# Patient Record
(demographics unavailable — no encounter records)

---

## 2024-10-21 NOTE — PHYSICAL EXAM
[Playful] : playful [Erythematous Oropharynx] : erythematous oropharynx [Tooth Eruption] : tooth eruption  [Capillary Refill <2s] : capillary refill < 2s [NL] : warm, clear

## 2024-10-23 NOTE — HISTORY OF PRESENT ILLNESS
[de-identified] : the first episode of fever in his life [FreeTextEntry6] : - since last night, developed fever 37.9C, a/w small amount of emesis/spitting up, sleeplessness,  - cold compress attempted at home - otherwise well, no other symptoms - mother thinks it maybe teething

## 2024-10-23 NOTE — HISTORY OF PRESENT ILLNESS
[de-identified] : the first episode of fever in his life [FreeTextEntry6] : - since last night, developed fever 37.9C, a/w small amount of emesis/spitting up, sleeplessness,  - cold compress attempted at home - otherwise well, no other symptoms - mother thinks it maybe teething

## 2024-10-23 NOTE — HISTORY OF PRESENT ILLNESS
[de-identified] : the first episode of fever in his life [FreeTextEntry6] : - since last night, developed fever 37.9C, a/w small amount of emesis/spitting up, sleeplessness,  - cold compress attempted at home - otherwise well, no other symptoms - mother thinks it maybe teething

## 2024-10-23 NOTE — DISCUSSION/SUMMARY
[FreeTextEntry1] : # the first episode of fever in his life - teething vs infection  - hold RVP testing for now, as he is otherwise asymptomatic - importance of monitoring emphasized; cooling, hydration, antipyretic as needed (dosage reviewed, may consider ibuprofen if more likely teething-induced) - RTC if no improvement or new symptoms/concerns arise - pedialyte samples x4 given; [id number] - will follow

## 2024-11-06 NOTE — PHYSICAL EXAM
[Alert] : alert [Normocephalic] : normocephalic [Flat Open Anterior [place]] : flat open anterior fontanelle [Red Reflex] : red reflex bilateral [PERRL] : PERRL [Normally Placed Ears] : normally placed ears [Auricles Well Formed] : auricles well formed [Clear Tympanic membranes] : clear tympanic membranes [Light reflex present] : light reflex present [Bony landmarks visible] : bony landmarks visible [Nares Patent] : nares patent [Palate Intact] : palate intact [Uvula Midline] : uvula midline [Supple, full passive range of motion] : supple, full passive range of motion [Symmetric Chest Rise] : symmetric chest rise [Clear to Auscultation Bilaterally] : clear to auscultation bilaterally [Regular Rate and Rhythm] : regular rate and rhythm [S1, S2 present] : S1, S2 present [+2 Femoral Pulses] : (+) 2 femoral pulses [Soft] : soft [Bowel Sounds] : bowel sounds present [Central Urethral Opening] : central urethral opening [Testicles Descended] : testicles descended bilaterally [No Abnormal Lymph Nodes Palpated] : no abnormal lymph nodes palpated [Symmetric Abduction and Rotation of hips] : symmetric abduction and rotation of hips [Symmetric Buttocks Creases] : symmetric buttocks creases [Straight] : straight [Cranial Nerves Grossly Intact] : cranial nerves grossly intact [Acute Distress] : no acute distress [Excessive Tearing] : no excessive tearing [Discharge] : no discharge [Palpable Masses] : no palpable masses [Murmurs] : no murmurs [Tender] : nontender [Distended] : nondistended [Hepatomegaly] : no hepatomegaly [Splenomegaly] : no splenomegaly [Allis Sign] : negative Allis sign [Metatarsus Varus] : no metatarsus varus [Leg Length Discrepancy] : no leg length discrepancy [Rash or Lesions] : no rash/lesions [de-identified] : 2 lower incisors,  [de-identified] : rt hip

## 2024-11-06 NOTE — HISTORY OF PRESENT ILLNESS
[Mother] : mother [In Crib] : sleeps in crib [Pacifier use] : Pacifier use [Brushing teeth] : brushing teeth [No] : Not at  exposure [Carbon Monoxide Detectors] : Carbon monoxide detectors [Smoke Detectors] : Smoke detectors [NO] : No [FreeTextEntry7] : has intermittent constipation, tends to get hard stools at times, mom makes most of his food, will eat, pears, spinach, broccoli, carrots, has been consitpated intermittently since started eating, also added formula recently [de-identified] : constipated stools hard, bms every2-3days,  [de-identified] : wakes up occasionally,  [de-identified] : pacifier rare, straw cups and open cups

## 2024-11-06 NOTE — DEVELOPMENTAL MILESTONES
[Uses basic gestures] : uses basic gestures [Says "Jitendra" or "Mama"] : says "Jitendra" or "Mama" nonspecifically [Sits well without support] : sits well without support [Transitions between sitting and lying] : transitions between sitting and lying [Balances on hands and knees] : balances on hands and knees [Crawls] : crawls [Picks up small objects with 3 fingers] : picks up small objects with 3 fingers and thumb [Releases objects intentionally] : releases objects intentionally [Friendship objects together] : bangs objects together [FreeTextEntry1] : crawling pulls to stand enzo,dog, cortes clapping already

## 2025-02-04 NOTE — PHYSICAL EXAM
[Alert] : alert [Normocephalic] : normocephalic [Closed Anterior [place]] : closed anterior fontanelle [Red Reflex] : red reflex bilateral [PERRL] : PERRL [Normally Placed Ears] : normally placed ears [Auricles Well Formed] : auricles well formed [Clear Tympanic membranes] : clear tympanic membranes [Light reflex present] : light reflex present [Bony landmarks visible] : bony landmarks visible [Nares Patent] : nares patent [Palate Intact] : palate intact [Uvula Midline] : uvula midline [Tooth Eruption] : tooth eruption [Supple, full passive range of motion] : supple, full passive range of motion [Symmetric Chest Rise] : symmetric chest rise [Clear to Auscultation Bilaterally] : clear to auscultation bilaterally [Regular Rate and Rhythm] : regular rate and rhythm [S1, S2 present] : S1, S2 present [+2 Femoral Pulses] : (+) 2 femoral pulses [Soft] : soft [Bowel Sounds] : normoactive bowel sounds [Central Urethral Opening] : central urethral opening [Testicles Descended] : testicles descended bilaterally [No Abnormal Lymph Nodes Palpated] : no abnormal lymph nodes palpated [Symmetric Abduction and Rotation of Hips] : symmetric abduction and rotation of hips [Straight] : straight [Cranial Nerves Grossly Intact] : cranial nerves grossly intact [Discharge] : no discharge [Palpable Masses] : no palpable masses [Murmurs] : no murmurs [Tender] : nontender [Distended] : nondistended [Hepatomegaly] : no hepatomegaly [Splenomegaly] : no splenomegaly [Allis Sign] : negative Allis sign [Rash or Lesions] : no rash/lesions [de-identified] : 2 upper and 2 lower incisors; uppr lateral incisors erupting, lower left incisor erupting

## 2025-02-04 NOTE — HISTORY OF PRESENT ILLNESS
[Mother] : mother [Breast milk] : breast milk [Normal] : Normal [Pacifier use] : Pacifier use [Toothpaste] : Primary Fluoride Source: Toothpaste [No] : Not at  exposure [Smoke Detectors] : Smoke detectors [Carbon Monoxide Detectors] : Carbon monoxide detectors [NO] : No [FreeTextEntry7] : 2 yo doing well, no concerns,  saw ortho for the hip click but all normal, had xrays of the hip and nl,  [de-identified] : eating all foods, no allergic reaction to any food

## 2025-02-04 NOTE — DEVELOPMENTAL MILESTONES
[Looks for hidden objects] : looks for hidden objects [Imitates new gestures] : imitates new gestures [Says "Dad" or "Mom" with meaning] : says "Dad" or "Mom" with meaning [Uses one word other than Mom or] : uses one word other than Mom or Dad or personal names [Follows a verbal command that] : follows a verbal command that includes a gesture [Takes first independent] : takes first independent steps [Stands without support] : stands without support [Drops object in a cup] : drops object in a cup [Picks up small object with 2 finger] : picks up small object with 2 finger pincer grasp [Picks up food and eats it] : picks up food and eats it [FreeTextEntry1] : enzo, and says s word, bellbell for his sister, dog, sissty, hey, hi,  walking, trying to run

## 2025-05-15 NOTE — PHYSICAL EXAM
[Alert] : alert [No Acute Distress] : no acute distress [Normocephalic] : normocephalic [Anterior [place] Closed] : anterior fontanelle closed [Red Reflex Bilateral] : red reflex bilateral [PERRL] : PERRL [Normally Placed Ears] : normally placed ears [Auricles Well Formed] : auricles well formed [Clear Tympanic membranes with present light reflex and bony landmarks] : clear tympanic membranes with present light reflex and bony landmarks [No Discharge] : no discharge [Nares Patent] : nares patent [Palate Intact] : palate intact [Uvula Midline] : uvula midline [Tooth Eruption] : tooth eruption  [Supple, full passive range of motion] : supple, full passive range of motion [No Palpable Masses] : no palpable masses [Symmetric Chest Rise] : symmetric chest rise [Clear to Auscultation Bilaterally] : clear to auscultation bilaterally [Regular Rate and Rhythm] : regular rate and rhythm [S1, S2 present] : S1, S2 present [No Murmurs] : no murmurs [+2 Femoral Pulses] : +2 femoral pulses [Soft] : soft [NonTender] : non tender [Non Distended] : non distended [Normoactive Bowel Sounds] : normoactive bowel sounds [No Hepatomegaly] : no hepatomegaly [No Splenomegaly] : no splenomegaly [Central Urethral Opening] : central urethral opening [Testicles Descended Bilaterally] : testicles descended bilaterally [Patent] : patent [Normally Placed] : normally placed [No Abnormal Lymph Nodes Palpated] : no abnormal lymph nodes palpated [No Clavicular Crepitus] : no clavicular crepitus [Negative Walden-Ortalani] : negative Walden-Ortalani [Symmetric Buttocks Creases] : symmetric buttocks creases [No Spinal Dimple] : no spinal dimple [NoTuft of Hair] : no tuft of hair [Cranial Nerves Grossly Intact] : cranial nerves grossly intact [No Rash or Lesions] : no rash or lesions [de-identified] : 4 upper and lower incisors

## 2025-05-15 NOTE — DEVELOPMENTAL MILESTONES
[Imitates scribbling] : imitates scribbling [Drinks from cup with little] : drinks from cup with little spilling [Points to ask for something] : points to ask for something or to get help [Uses 3 words other than names] : uses 3 words other than names [Speaks in sounds that seem like] : speaks in sounds that seem like an unknown language [Follows directions that do not] : follows direction that do not include a gesture [Looks when parent says,] : looks when parent says, "Where is...?" [Squats to  objects] : squats to  objects [Crawls up a few steps] : crawls up a few steps [Begins to run] : begins to run [Makes michael with crayon] : makes michael with nikkiyon [Drops object into and takes object] : drops object into and takes object out of container [FreeTextEntry1] : mama, nilay, enzo, no, thankyou, shoes, byby, hi, dog, hello, 10-20 words, up, out, sister,

## 2025-05-15 NOTE — PHYSICAL EXAM
[Alert] : alert [No Acute Distress] : no acute distress [Normocephalic] : normocephalic [Anterior [place] Closed] : anterior fontanelle closed [Red Reflex Bilateral] : red reflex bilateral [PERRL] : PERRL [Normally Placed Ears] : normally placed ears [Auricles Well Formed] : auricles well formed [Clear Tympanic membranes with present light reflex and bony landmarks] : clear tympanic membranes with present light reflex and bony landmarks [No Discharge] : no discharge [Nares Patent] : nares patent [Palate Intact] : palate intact [Uvula Midline] : uvula midline [Tooth Eruption] : tooth eruption  [Supple, full passive range of motion] : supple, full passive range of motion [No Palpable Masses] : no palpable masses [Symmetric Chest Rise] : symmetric chest rise [Clear to Auscultation Bilaterally] : clear to auscultation bilaterally [Regular Rate and Rhythm] : regular rate and rhythm [S1, S2 present] : S1, S2 present [No Murmurs] : no murmurs [+2 Femoral Pulses] : +2 femoral pulses [Soft] : soft [NonTender] : non tender [Non Distended] : non distended [Normoactive Bowel Sounds] : normoactive bowel sounds [No Hepatomegaly] : no hepatomegaly [No Splenomegaly] : no splenomegaly [Central Urethral Opening] : central urethral opening [Testicles Descended Bilaterally] : testicles descended bilaterally [Patent] : patent [Normally Placed] : normally placed [No Abnormal Lymph Nodes Palpated] : no abnormal lymph nodes palpated [No Clavicular Crepitus] : no clavicular crepitus [Negative Walden-Ortalani] : negative Walden-Ortalani [Symmetric Buttocks Creases] : symmetric buttocks creases [No Spinal Dimple] : no spinal dimple [NoTuft of Hair] : no tuft of hair [Cranial Nerves Grossly Intact] : cranial nerves grossly intact [No Rash or Lesions] : no rash or lesions [de-identified] : 4 upper and lower incisors